# Patient Record
Sex: MALE | Race: WHITE
[De-identification: names, ages, dates, MRNs, and addresses within clinical notes are randomized per-mention and may not be internally consistent; named-entity substitution may affect disease eponyms.]

---

## 2021-07-23 ENCOUNTER — TRANSCRIPTION ENCOUNTER (OUTPATIENT)
Age: 21
End: 2021-07-23

## 2021-07-25 ENCOUNTER — TRANSCRIPTION ENCOUNTER (OUTPATIENT)
Age: 21
End: 2021-07-25

## 2022-04-12 ENCOUNTER — EMERGENCY (EMERGENCY)
Facility: HOSPITAL | Age: 22
LOS: 0 days | Discharge: HOME | End: 2022-04-13
Attending: EMERGENCY MEDICINE | Admitting: EMERGENCY MEDICINE
Payer: COMMERCIAL

## 2022-04-12 VITALS
DIASTOLIC BLOOD PRESSURE: 87 MMHG | TEMPERATURE: 98 F | RESPIRATION RATE: 18 BRPM | SYSTOLIC BLOOD PRESSURE: 140 MMHG | OXYGEN SATURATION: 98 % | HEART RATE: 114 BPM | WEIGHT: 139.99 LBS

## 2022-04-12 DIAGNOSIS — M25.512 PAIN IN LEFT SHOULDER: ICD-10-CM

## 2022-04-12 DIAGNOSIS — S43.015A ANTERIOR DISLOCATION OF LEFT HUMERUS, INITIAL ENCOUNTER: ICD-10-CM

## 2022-04-12 DIAGNOSIS — R20.0 ANESTHESIA OF SKIN: ICD-10-CM
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
## 2022-04-12 PROCEDURE — 99284 EMERGENCY DEPT VISIT MOD MDM: CPT | Mod: 57

## 2022-04-12 PROCEDURE — 23650 CLTX SHO DSLC W/MNPJ WO ANES: CPT | Mod: 54,LT

## 2022-04-12 RX ORDER — LIDOCAINE HCL 20 MG/ML
10 VIAL (ML) INJECTION ONCE
Refills: 0 | Status: COMPLETED | OUTPATIENT
Start: 2022-04-12 | End: 2022-04-12

## 2022-04-12 RX ORDER — KETOROLAC TROMETHAMINE 30 MG/ML
60 SYRINGE (ML) INJECTION ONCE
Refills: 0 | Status: DISCONTINUED | OUTPATIENT
Start: 2022-04-12 | End: 2022-04-12

## 2022-04-12 RX ADMIN — Medication 60 MILLIGRAM(S): at 23:52

## 2022-04-12 NOTE — ED ADULT TRIAGE NOTE - STATUS:
Intact Z Plasty Text: The lesion was extirpated to the level of the fat with a #15 scalpel blade.  Given the location of the defect, shape of the defect and the proximity to free margins a Z-plasty was deemed most appropriate for repair.  Using a sterile surgical marker, the appropriate transposition arms of the Z-plasty were drawn incorporating the defect and placing the expected incisions within the relaxed skin tension lines where possible.    The area thus outlined was incised deep to adipose tissue with a #15 scalpel blade.  The skin margins were undermined to an appropriate distance in all directions utilizing iris scissors.  The opposing transposition arms were then transposed into place in opposite direction and anchored with interrupted buried subcutaneous sutures.

## 2022-04-12 NOTE — ED PROVIDER NOTE - PATIENT PORTAL LINK FT
You can access the FollowMyHealth Patient Portal offered by [facility] by registering at the following website: [email or web address]. By joining [employer]’s FollowMyHealth portal, you will also be able to view your health information using other applications (apps) compatible with our system.

## 2022-04-12 NOTE — ED PROVIDER NOTE - NS ED ROS FT
Constitutional: (-) fever, (-) chills  Cardiovascular: (-) chest pain, (-) syncope  Respiratory: (-) cough, (-) shortness of breath, (-) dyspnea,   Gastrointestinal: (-) vomiting, (-) diarrhea, (-)nausea,  Musculoskeletal: (-) neck pain, (-) back pain, (+) joint pain,  Integumentary: (-) rash, (-) edema, (-) bruises  Neurological: (-) headache, (-) loc, (-) dizziness, (-) tingling, (+)numbness,  Psychiatric: (-) hallucinations, (-)nervousness, (-)depression, (-)SI/HI  Allergic/Immunologic: (-) pruritus

## 2022-04-12 NOTE — ED PROVIDER NOTE - CARE PROVIDER_API CALL
Alfie Stewart (MD)  Orthopaedic Surgery  [address] [place] [address]  Phone: [phone]  Fax: [phone]  Follow Up Time: 1-3 Days

## 2022-04-12 NOTE — ED PROVIDER NOTE - NSFOLLOWUPINSTRUCTIONS_ED_ALL_ED_FT
follow up with orthopedics in 1-2 days     please wear immobilizer until seen by ortho      SHOULDER DISLOCATION - AfterCare(R) Instructions(ER/ED)     Shoulder Dislocation    WHAT YOU NEED TO KNOW:    A shoulder dislocation occurs when the top of your arm bone (humerus) moves out of the socket in your shoulder blade.Shoulder Anatomy         DISCHARGE INSTRUCTIONS:    Return to the emergency department if:     Your injured shoulder and arm become pale or cold.      You cannot move your injured shoulder and arm.      You have increased redness or swelling in your injured shoulder.      Your shoulder becomes dislocated again.    Contact your healthcare provider if:     You have a fever.      You have increased pain in your injured shoulder and arm even after you rest and take your medicine.      You have new weakness or numbness in your injured shoulder and arm.      You have questions or concerns about your condition or care.    Medicines:You may need any of the following:     Prescription pain medicine may be given. Ask your healthcare provider how to take this medicine safely. Some prescription pain medicines contain acetaminophen. Do not take other medicines that contain acetaminophen without talking to your healthcare provider. Too much acetaminophen may cause liver damage. Prescription pain medicine may cause constipation. Ask your healthcare provider how to prevent or treat constipation.       A muscle relaxer may help the tight muscles in your shoulder relax.      Take your medicine as directed. Contact your healthcare provider if you think your medicine is not helping or if you have side effects. Tell him or her if you are allergic to any medicine. Keep a list of the medicines, vitamins, and herbs you take. Include the amounts, and when and why you take them. Bring the list or the pill bottles to follow-up visits. Carry your medicine list with you in case of an emergency.    Rest your injured shoulder and arm: Rest helps your muscles and tissues heal. Avoid activities that cause pain or use an overhead arm motion. Your healthcare provider will tell you when it is safe to return to sports or other daily activities.    How to wear a brace, sling, or splint: A brace, sling, or splint may be needed to limit your movement and protect your injured shoulder.Shoulder Sling         Wear your brace, sling, or splint all the time. Take it off only to bathe or do exercises as directed. Ask your healthcare provider how many weeks you should wear it.      Keep your skin clean and dry. Place padding under your armpit to help absorb sweat and prevent sores on your skin.      Do not hunch your shoulders. This may cause pain. Keep your shoulders relaxed.      Support your wrist and hand with the sling. Cover the knuckles on your hand with your sling. Your wrist should be positioned higher than your elbow. Your wrist may start to hurt or go numb if your sling is too short.    Apply ice: Ice helps decrease swelling and pain. Ice may also help prevent tissue damage. Use an ice pack, or put crushed ice in a plastic bag. Cover it with a towel before you place it on your shoulder. Apply ice for 15 to 20 minutes every hour or as directed.    Exercises: Begin gentle exercises as directed. After healing begins, you may start light exercises so your shoulder does not get stiff. Go to physical therapy if directed. A physical therapist teaches you exercises to help improve movement and strength, and to decrease pain. Do not lift heavy objects or do any exercise that causes severe pain.    Follow up with your healthcare provider in 5 to 10 days: Write down your questions so you remember to ask them during your visits.              Shoulder Immobilizer    WHAT YOU NEED TO KNOW:    A shoulder immobilizer is a device used to keep your arm from moving while your shoulder heals. It is different from a sling because it keeps your arm close to your body. An immobilizer usually has a chest band, an arm band, and a wrist band. Each band will be attached to the chest band. This helps keep your arm from moving up, down, or away from your body.    Shoulder Immobilizer         DISCHARGE INSTRUCTIONS:    Call your doctor if:   •Your arm, hand, or fingers become swollen, numb, painful, or pale.      •You have questions about how to use the immobilizer.      How to use a shoulder immobilizer: Your healthcare provider will teach you how to put on your immobilizer. The following are general guidelines to help you remember what your provider teaches you:   •Place the chest band where you want it to be. Your device may have a specific part that goes on the side of the injured shoulder.      •Wrap the chest band around your body. The band should be snug but not too tight.      •Hook the wrist strap to the chest band. Wrap the wrist band around your wrist. Make sure your hand can move freely. You will need to move your hand and wiggle your fingers to prevent stiffness.      •Hook the arm band to the chest band, under your armpit. Wrap the short end of the arm band around your arm, right above the elbow. Pull the long end of the arm band behind your back. Secure it to the back of the chest band.      •Attach the shoulder strap to the front of the chest band. Attach the band on the side of your body that is not injured. The buckle should be on the front of your body.      •Take the other end of the strap up and over the shoulder that is not injured. Attach it to the chest band behind your back. Your device may have a single strap to attach. It may instead have 2 straps that need to cross your back to form an X.      •Use the buckle to adjust the shoulder strap into the correct position. Your arm will be at about a 90 degree angle in front of your body.      Follow up with your doctor or orthopedist as directed: You may need tests to check how your arm is healing. You may need to have the immobilizer adjusted, or change to a different kind of immobilizer. If your injury has healed, you may not need to keep using the immobilizer. Write down your questions so you remember to ask them during your visits.       © Copyright [employer] 2021

## 2022-04-12 NOTE — ED PROVIDER NOTE - PHYSICAL EXAMINATION
Vital Signs: I have reviewed the initial vital signs.  Constitutional: well-nourished, no acute distress  Musculoskeletal: (+) Obvious dislocation of L shoulder, decreased ROM, distal sensation intact. No overlying skin changes, abrasions or lacerations.   Integumentary: (-) laceration, (-) ecchymosis (-) swelling   Neurologic: awake, alert, extremities’ motor and sensory functions grossly intact, no focal deficits  heme: (-) no adenopathy (-)lymphangitis Vital Signs: I have reviewed the initial vital signs.  Constitutional: well-nourished, no acute distress  Musculoskeletal: (+) Obvious visual and palpable dislocation of L shoulder, decreased ROM, distal sensation intact. No overlying skin changes, abrasions or lacerations.   Integumentary: (-) laceration, (-) ecchymosis (-) swelling   Neurologic: awake, alert, extremities’ motor and sensory functions grossly intact, no focal deficits  heme: (-) no adenopathy (-)lymphangitis

## 2022-04-12 NOTE — ED PROVIDER NOTE - CLINICAL SUMMARY MEDICAL DECISION MAKING FREE TEXT BOX
21yM pw left shoulder pain  40 minutes ago  while playing  basketball -   dislocated shoulder  NVI    -  pt  could not  tolerated  reduction  with pain meds in  lidocaine block -  procedural sedation performed   + reduction .  immobilizer  applied   remains NVI   Patient to be discharged from ED in well appearing condition. Any available test results were discussed with and printed  for patient.  Verbal instructions given, including instructions to return to ED immediately for any new, worsening, or concerning symptoms. Limitations of ED work up discussed.  Patient reports understanding of above with capacity and insight. Written discharge instructions additionally given, including follow-up plan with  ortho .  Dw family at [facility] as well

## 2022-04-12 NOTE — ED PROVIDER NOTE - OBJECTIVE STATEMENT
22 y/o M with no pmhx presents to the ED for evaluation of L shoulder s/p injury while playing basketball about 40 min PTA. Pt states that another players arm, smacked down onto his L shoulder while they were playing and since he has increased pain, numbness, and severe decrease in ROM of the shoulder. Denies any head trauma or LOC. No previous dislocations in the past.

## 2022-04-12 NOTE — ED PROVIDER NOTE - NS ED ATTENDING STATEMENT MOD
This was a shared visit with the JANET. I reviewed and verified the documentation and independently performed the documented:

## 2022-04-13 VITALS
SYSTOLIC BLOOD PRESSURE: 147 MMHG | RESPIRATION RATE: 18 BRPM | HEART RATE: 82 BPM | OXYGEN SATURATION: 98 % | DIASTOLIC BLOOD PRESSURE: 84 MMHG

## 2022-04-13 PROCEDURE — 73030 X-RAY EXAM OF SHOULDER: CPT | Mod: 26,LT,76

## 2022-04-13 RX ORDER — KETAMINE HYDROCHLORIDE 100 MG/ML
40 INJECTION INTRAMUSCULAR; INTRAVENOUS ONCE
Refills: 0 | Status: DISCONTINUED | OUTPATIENT
Start: 2022-04-13 | End: 2022-04-13

## 2022-04-13 RX ORDER — PROPOFOL 10 MG/ML
40 INJECTION, EMULSION INTRAVENOUS ONCE
Refills: 0 | Status: COMPLETED | OUTPATIENT
Start: 2022-04-13 | End: 2022-04-13

## 2022-04-13 RX ADMIN — Medication 10 MILLILITER(S): at 00:06

## 2022-04-13 RX ADMIN — PROPOFOL 40 MILLIGRAM(S): 10 INJECTION, EMULSION INTRAVENOUS at 01:25

## 2022-04-13 RX ADMIN — KETAMINE HYDROCHLORIDE 40 MILLIGRAM(S): 100 INJECTION INTRAMUSCULAR; INTRAVENOUS at 01:25

## 2022-04-13 RX ADMIN — Medication 60 MILLIGRAM(S): at 00:53

## 2022-04-13 NOTE — PROCEDURAL SAFETY CHECKLIST WITH OR WITHOUT SEDATION - NSPOSTCOMMENTFT_GEN_ALL_CORE
pt had left shoulder reduction, pt explained procedure risks and benefits prior to procedure, pt verbalized understanding, consented to procedure, continuous cardiac monitoring in progress, vital signs monitored during procedure, medications administered as ordered, pt tolerated procedure well, xray to be completed, safety maintained, will continue to monitor

## 2022-04-13 NOTE — ED PROCEDURE NOTE - CPROC ED TIME OUT STATEMENT1
PT WAS TOLD THAT SHE NEEDS BLOOD WORK DRAWN FOR HER CT SCAN  SHE NEEDS A BUN / CREA AND SHE WILL BE HAVING IT DONE IN [place] TOMORROW AND SHE IS ASKING IF YOU CAN FAX THE ORDER TO THEM -[phone]  IF YOU HAVE ANY QUESTIONS ON THE LABS THE FACILITY CAN BE REACHED AT [address] [phone]  
“Patient's name, , procedure and correct site were confirmed during the [place] Timeout.”
“Patient's name, , procedure and correct site were confirmed during the [place] Timeout.”

## 2022-04-13 NOTE — ED PROCEDURE NOTE - NS_POSTPROCCAREGUIDE_ED_ALL_ED
Patient is now fully awake, with vital signs and temperature stable, hydration is adequate, patients Telma’s  score is at baseline (or greater than 8), patient and escort has received  discharge education.

## 2022-04-13 NOTE — ED PROCEDURE NOTE - NS ED ATTENDING STATEMENT MOD
This was a shared visit with the JANET. I reviewed and verified the documentation and independently performed the documented:
This was a shared visit with the JANET. I reviewed and verified the documentation and independently performed the documented:

## 2022-04-13 NOTE — ED ADULT NURSE REASSESSMENT NOTE - NS ED NURSE REASSESS COMMENT FT1
pt a&ox3, pt had left shoulder reduction, continuous cardiac monitoring in progress during procedure, vital signs monitored, medications administered as ordered, pt tolerated procedure well, arm in sling by MD, pt awaiting results of post reduction xray, safety maintained, pt denies pain at time of assessment

## 2022-05-30 ENCOUNTER — NON-APPOINTMENT (OUTPATIENT)
Age: 22
End: 2022-05-30

## 2022-08-18 ENCOUNTER — EMERGENCY (EMERGENCY)
Facility: HOSPITAL | Age: 22
LOS: 0 days | Discharge: HOME | End: 2022-08-18
Attending: STUDENT IN AN ORGANIZED HEALTH CARE EDUCATION/TRAINING PROGRAM | Admitting: STUDENT IN AN ORGANIZED HEALTH CARE EDUCATION/TRAINING PROGRAM

## 2022-08-18 VITALS
DIASTOLIC BLOOD PRESSURE: 103 MMHG | OXYGEN SATURATION: 100 % | SYSTOLIC BLOOD PRESSURE: 144 MMHG | RESPIRATION RATE: 12 BRPM | HEART RATE: 66 BPM | TEMPERATURE: 98 F

## 2022-08-18 VITALS
HEIGHT: 68 IN | RESPIRATION RATE: 18 BRPM | OXYGEN SATURATION: 99 % | WEIGHT: 175.05 LBS | HEART RATE: 119 BPM | SYSTOLIC BLOOD PRESSURE: 138 MMHG | TEMPERATURE: 99 F | DIASTOLIC BLOOD PRESSURE: 74 MMHG
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
## 2022-08-18 DIAGNOSIS — F31.9 BIPOLAR DISORDER, UNSPECIFIED: ICD-10-CM
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
## 2022-08-18 DIAGNOSIS — M25.512 PAIN IN LEFT SHOULDER: ICD-10-CM
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
## 2022-08-18 DIAGNOSIS — S43.015A ANTERIOR DISLOCATION OF LEFT HUMERUS, INITIAL ENCOUNTER: ICD-10-CM
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
## 2022-08-18 PROCEDURE — 23655 CLTX SHO DSLC W/MNPJ W/ANES: CPT | Mod: 54,LT

## 2022-08-18 PROCEDURE — 99284 EMERGENCY DEPT VISIT MOD MDM: CPT | Mod: 25

## 2022-08-18 PROCEDURE — 73030 X-RAY EXAM OF SHOULDER: CPT | Mod: 26,LT,76

## 2022-08-18 RX ORDER — PROPOFOL 10 MG/ML
40 INJECTION, EMULSION INTRAVENOUS ONCE
Refills: 0 | Status: COMPLETED | OUTPATIENT
Start: 2022-08-18 | End: 2022-08-18

## 2022-08-18 RX ORDER — KETAMINE HYDROCHLORIDE 100 MG/ML
40 INJECTION INTRAMUSCULAR; INTRAVENOUS ONCE
Refills: 0 | Status: DISCONTINUED | OUTPATIENT
Start: 2022-08-18 | End: 2022-08-18

## 2022-08-18 RX ADMIN — KETAMINE HYDROCHLORIDE 40 MILLIGRAM(S): 100 INJECTION INTRAMUSCULAR; INTRAVENOUS at 15:37

## 2022-08-18 RX ADMIN — PROPOFOL 40 MILLIGRAM(S): 10 INJECTION, EMULSION INTRAVENOUS at 15:37

## 2022-08-18 NOTE — ED PROVIDER NOTE - CLINICAL SUMMARY MEDICAL DECISION MAKING FREE TEXT BOX
22-year-old man with a history of shoulder dislocation back in April presents here complaining of left shoulder dislocation.  Patient states he was playing basketball swiped the ball and suddenly felt his shoulder pop out.  No numbness or tingling no other complaints after his previous dislocation has been following up with Ortho. VS reviewed imaging demonstrated shoulder dislocation requiring procedural sedation, shoulder reduced, Patient a spoken to in detail about results  All questions addressed.  Results of ED work up discussedPatient has proper follow up. Return precautions given. patient to follow up with ortho.

## 2022-08-18 NOTE — ED PROVIDER NOTE - OBJECTIVE STATEMENT
23 y/o male presents to the ED with left shoulder injury while playing basketball. patient with similar symptoms with previous shoulder dislocation in past. patient dx with labrum tear partial and had Physical therapy. patient denies any tingling to digits. no elbow or wrist pain. no falls or direct blow. no head injury or neck pain. patient with deformity at left shoulder.

## 2022-08-18 NOTE — ED ADULT NURSE NOTE - NSIMPLEMENTINTERV_GEN_ALL_ED
no Implemented All Universal Safety Interventions:  [place] to call system. Call bell, personal items and telephone within reach. Instruct patient to call for assistance. Room bathroom lighting operational. Non-slip footwear when patient is off stretcher. Physically safe environment: no spills, clutter or unnecessary equipment. Stretcher in lowest position, wheels locked, appropriate side rails in place.

## 2022-08-18 NOTE — ED PROVIDER NOTE - NSFOLLOWUPINSTRUCTIONS_ED_ALL_ED_FT
Procedural Sedation    During your Emergency Department visit, you were given medication to help relax you and alleviate pain to aid in a diagnostic or therapeutic procedure. The medication given is typically short-acting but may have some lingering effects for up to 48 hours. Do not be alone - have a responsible adult stay with you until you are awake and alert. Do not drive or operate heavy machinery for the next 24 hours. Do not drink alcohol or smoke or take medicines that cause drowsiness. Do not make important decisions or sign legal documents or take care of children on your own.    SEEK IMMEDIATE MEDICAL CARE IF YOU HAVE ANY OF THE FOLLOWING SYMPTOMS: trouble breathing, confusion, inability to urinate, severe pain, rash, lightheadedness/dizziness, or fainting.        SHOULDER DISLOCATION - AfterCare(R) Instructions(ER/ED)     Shoulder Dislocation    WHAT YOU NEED TO KNOW:    A shoulder dislocation occurs when the top of your arm bone (humerus) moves out of the socket in your shoulder blade.Shoulder Anatomy         DISCHARGE INSTRUCTIONS:    Return to the emergency department if:     Your injured shoulder and arm become pale or cold.      You cannot move your injured shoulder and arm.      You have increased redness or swelling in your injured shoulder.      Your shoulder becomes dislocated again.    Contact your healthcare provider if:     You have a fever.      You have increased pain in your injured shoulder and arm even after you rest and take your medicine.      You have new weakness or numbness in your injured shoulder and arm.      You have questions or concerns about your condition or care.    Medicines:You may need any of the following:     Prescription pain medicine may be given. Ask your healthcare provider how to take this medicine safely. Some prescription pain medicines contain acetaminophen. Do not take other medicines that contain acetaminophen without talking to your healthcare provider. Too much acetaminophen may cause liver damage. Prescription pain medicine may cause constipation. Ask your healthcare provider how to prevent or treat constipation.       A muscle relaxer may help the tight muscles in your shoulder relax.      Take your medicine as directed. Contact your healthcare provider if you think your medicine is not helping or if you have side effects. Tell him or her if you are allergic to any medicine. Keep a list of the medicines, vitamins, and herbs you take. Include the amounts, and when and why you take them. Bring the list or the pill bottles to follow-up visits. Carry your medicine list with you in case of an emergency.    Rest your injured shoulder and arm: Rest helps your muscles and tissues heal. Avoid activities that cause pain or use an overhead arm motion. Your healthcare provider will tell you when it is safe to return to sports or other daily activities.    How to wear a brace, sling, or splint: A brace, sling, or splint may be needed to limit your movement and protect your injured shoulder.Shoulder Sling         Wear your brace, sling, or splint all the time. Take it off only to bathe or do exercises as directed. Ask your healthcare provider how many weeks you should wear it.      Keep your skin clean and dry. Place padding under your armpit to help absorb sweat and prevent sores on your skin.      Do not hunch your shoulders. This may cause pain. Keep your shoulders relaxed.      Support your wrist and hand with the sling. Cover the knuckles on your hand with your sling. Your wrist should be positioned higher than your elbow. Your wrist may start to hurt or go numb if your sling is too short.    Apply ice: Ice helps decrease swelling and pain. Ice may also help prevent tissue damage. Use an ice pack, or put crushed ice in a plastic bag. Cover it with a towel before you place it on your shoulder. Apply ice for 15 to 20 minutes every hour or as directed.    Exercises: Begin gentle exercises as directed. After healing begins, you may start light exercises so your shoulder does not get stiff. Go to physical therapy if directed. A physical therapist teaches you exercises to help improve movement and strength, and to decrease pain. Do not lift heavy objects or do any exercise that causes severe pain.    Follow up with your healthcare provider in 5 to 10 days: Write down your questions so you remember to ask them during your visits.

## 2022-08-18 NOTE — ED PROVIDER NOTE - CARE PROVIDER_API CALL
Hong Rebolledo)  Orthopaedic Surgery  [address] [place] [address]  Phone: [phone]  Fax: [phone]  Follow Up Time:

## 2022-08-18 NOTE — PROCEDURAL SAFETY CHECKLIST WITH OR WITHOUT SEDATION - NSPOSTCOMMENTFT_GEN_ALL_CORE
pt had left shoulder reduction  prior to procedure pt was explained risks vs benefits of procedure   pt consented to procedure   vital signs monitored, pt on continuous cardiac monitoring   airway cart at bedside

## 2022-08-18 NOTE — ED PROVIDER NOTE - PATIENT PORTAL LINK FT
You can access the FollowMyHealth Patient Portal offered by [facility] by registering at the following website: [email or web address]. By joining [employer]’s FollowMyHealth portal, you will also be able to view your health information using other applications (apps) compatible with our system.

## 2022-08-18 NOTE — ED PROVIDER NOTE - PHYSICAL EXAMINATION
Vital Signs: I have reviewed the initial vital signs.  Constitutional: well-nourished, no acute distress, normocephalic  Eyes: PERRLA, EOMI, no nystagmus, clear conjunctiva  ENT: MMM  Cardiovascular: regular rate, regular rhythm, no murmur appreciated  Respiratory: unlabored respiratory effort, clear to auscultation bilaterally  Gastrointestinal: soft, non-tender, non-distended  abdomen, no pulsatile mass  Musculoskeletal: supple neck, left upper extremity- +glenoid fossa deformity with decreased rom at shoulder, good rom elbow and wrist, good rom of digits, cap refill in tact  Integumentary: warm, dry, no rash  Neurologic: awake, alert, cranial nerves II-XII grossly intact, extremities’ motor and sensory functions grossly intact, no focal deficits, GCS 15

## 2022-08-18 NOTE — ED PROVIDER NOTE - ATTENDING APP SHARED VISIT CONTRIBUTION OF CARE
22-year-old man with a history of shoulder dislocation back in April presents here complaining of left shoulder dislocation.  Patient states he was playing basketball swiped the ball and suddenly felt his shoulder pop out.  No numbness or tingling no other complaints after his previous dislocation has been following up with Ortho.  CONSTITUTIONAL: WA / WN / NAD  HEAD: NCAT  EYES: PERRL; EOMI;   ENT: Normal pharynx; mucous membranes pink/moist, no erythema.  NECK: Supple;   MSK/EXT: + deformity of left shoulder + ttp , NVS in tact pulses & sensation in tact  SKIN: Warm and dry;   NEURO: AAOx3  PSYCH: Memory Intact, Normal Affect

## 2022-08-18 NOTE — ED PROVIDER NOTE - PROGRESS NOTE DETAILS
SR: spoke with patient and mother extensively , refusing local injection as he states did not work last time, consent for procedural sedation obtained. reduction preformed and successful. rpt xray at bedside and shoulder immobilizer applied

## 2022-08-22 ENCOUNTER — NON-APPOINTMENT (OUTPATIENT)
Age: 22
End: 2022-08-22

## 2022-08-22 DIAGNOSIS — S43.006A UNSPECIFIED DISLOCATION OF UNSPECIFIED SHOULDER JOINT, INITIAL ENCOUNTER: ICD-10-CM

## 2022-08-22 PROBLEM — Z00.00 ENCOUNTER FOR PREVENTIVE HEALTH EXAMINATION: Status: ACTIVE | Noted: 2022-08-22

## 2022-08-24 DIAGNOSIS — S43.005A UNSPECIFIED DISLOCATION OF LEFT SHOULDER JOINT, INITIAL ENCOUNTER: ICD-10-CM

## 2022-10-09 ENCOUNTER — EMERGENCY (EMERGENCY)
Facility: HOSPITAL | Age: 22
LOS: 0 days | Discharge: HOME | End: 2022-10-09
Attending: EMERGENCY MEDICINE | Admitting: EMERGENCY MEDICINE

## 2022-10-09 VITALS
DIASTOLIC BLOOD PRESSURE: 75 MMHG | RESPIRATION RATE: 18 BRPM | OXYGEN SATURATION: 97 % | HEIGHT: 68 IN | WEIGHT: 179.9 LBS | SYSTOLIC BLOOD PRESSURE: 113 MMHG | HEART RATE: 62 BPM | TEMPERATURE: 98 F
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
## 2022-10-09 DIAGNOSIS — S43.015A ANTERIOR DISLOCATION OF LEFT HUMERUS, INITIAL ENCOUNTER: ICD-10-CM

## 2022-10-09 DIAGNOSIS — M25.512 PAIN IN LEFT SHOULDER: ICD-10-CM

## 2022-10-09 DIAGNOSIS — F31.9 BIPOLAR DISORDER, UNSPECIFIED: ICD-10-CM

## 2022-10-09 PROBLEM — Z87.39 PERSONAL HISTORY OF OTHER DISEASES OF THE MUSCULOSKELETAL SYSTEM AND CONNECTIVE TISSUE: Chronic | Status: ACTIVE | Noted: 2022-08-18

## 2022-10-09 PROCEDURE — 99284 EMERGENCY DEPT VISIT MOD MDM: CPT | Mod: 57

## 2022-10-09 PROCEDURE — 23650 CLTX SHO DSLC W/MNPJ WO ANES: CPT | Mod: 54,LT,78

## 2022-10-09 PROCEDURE — 73030 X-RAY EXAM OF SHOULDER: CPT | Mod: 26,LT,76

## 2022-10-09 RX ORDER — LIDOCAINE HCL 20 MG/ML
10 VIAL (ML) INJECTION ONCE
Refills: 0 | Status: COMPLETED | OUTPATIENT
Start: 2022-10-09 | End: 2022-10-09

## 2022-10-09 RX ORDER — MORPHINE SULFATE 50 MG/1
8 CAPSULE, EXTENDED RELEASE ORAL ONCE
Refills: 0 | Status: DISCONTINUED | OUTPATIENT
Start: 2022-10-09 | End: 2022-10-09

## 2022-10-09 RX ADMIN — MORPHINE SULFATE 8 MILLIGRAM(S): 50 CAPSULE, EXTENDED RELEASE ORAL at 04:50

## 2022-10-09 RX ADMIN — Medication 10 MILLILITER(S): at 04:50

## 2022-10-09 NOTE — ED PROVIDER NOTE - CLINICAL SUMMARY MEDICAL DECISION MAKING FREE TEXT BOX
Given morphine and intra-articular lidocaine.  Close reduction done.  Placed on sling.  Referred to Ortho.

## 2022-10-09 NOTE — ED PROVIDER NOTE - PHYSICAL EXAMINATION
VITAL SIGNS: I have reviewed nursing notes and confirm.  CONSTITUTIONAL: Well-developed; well-nourished; in no acute distress.  SKIN: Skin exam is warm and dry, no acute rash.  HEAD: Normocephalic; atraumatic.  EYES: Conjunctiva and sclera clear.  EXT: (+) TTP to L shoulder with limited ROM. (+) flattening of left glenohumeral joint. Limited ROM 2/2 pain. No elbow/forearm TTP. Radial pulses 2+ B/L and equal.  Strength 5/5. Sensation intact.   NEURO: Alert, oriented. Grossly unremarkable. No focal deficits.

## 2022-10-09 NOTE — ED PROVIDER NOTE - PATIENT PORTAL LINK FT
You can access the FollowMyHealth Patient Portal offered by [facility] by registering at the following website: [email or web address]. By joining [employer]’s FollowMyHealth portal, you will also be able to view your health information using other applications (apps) compatible with our system.

## 2022-10-09 NOTE — ED PROVIDER NOTE - ATTENDING APP SHARED VISIT CONTRIBUTION OF CARE
I personally evaluated the patient. I reviewed the Resident´s or Physician Assistant´s note (as assigned above), and agree with the findings and plan except as documented in my note.  22-year-old male, history of shoulder dislocations, was sleeping and dislocated left shoulder.  No trauma.  Exam shows tenderness and deformity of shoulder, intact neurovascular status.

## 2022-10-09 NOTE — ED PROVIDER NOTE - CARE PROVIDER_API CALL
Tay Quispe)  [facility]  [address] [place] [address] [place] [address]  Phone: [phone]  Fax: [phone]  Follow Up Time: 1-3 Days

## 2022-10-09 NOTE — ED PROVIDER NOTE - OBJECTIVE STATEMENT
23 yo M with PMHx of bipolar disorder and recurrent L shoulder dislocation presents to the ED c/o moderate left shoulder pain. Pt was asleep and thinks he reached his arm above his head and immediately felt pain in his shoulder. Pain is throbbing, constant, non-radiating and worse with movement. Symptoms are consistent with previous shoulder dislocation. He denies other complaints. No fever, chills, numbness, weakness.

## 2022-10-09 NOTE — ED PROVIDER NOTE - NSFOLLOWUPINSTRUCTIONS_ED_ALL_ED_FT
SHOULDER DISLOCATION - AfterCare(R) Instructions(ER/ED)     Shoulder Dislocation    WHAT YOU NEED TO KNOW:    A shoulder dislocation occurs when the top of your arm bone (humerus) moves out of the socket in your shoulder blade. Shoulder Anatomy         DISCHARGE INSTRUCTIONS:    Return to the emergency department if:     Your injured shoulder and arm become pale or cold.      You cannot move your injured shoulder and arm.      You have increased redness or swelling in your injured shoulder.      Your shoulder becomes dislocated again.    Contact your healthcare provider if:     You have a fever.      You have increased pain in your injured shoulder and arm even after you rest and take your medicine.      You have new weakness or numbness in your injured shoulder and arm.      You have questions or concerns about your condition or care.    Medicines:You may need any of the following:     Prescription pain medicine may be given. Ask your healthcare provider how to take this medicine safely. Some prescription pain medicines contain acetaminophen. Do not take other medicines that contain acetaminophen without talking to your healthcare provider. Too much acetaminophen may cause liver damage. Prescription pain medicine may cause constipation. Ask your healthcare provider how to prevent or treat constipation.       A muscle relaxer may help the tight muscles in your shoulder relax.      Take your medicine as directed. Contact your healthcare provider if you think your medicine is not helping or if you have side effects. Tell him or her if you are allergic to any medicine. Keep a list of the medicines, vitamins, and herbs you take. Include the amounts, and when and why you take them. Bring the list or the pill bottles to follow-up visits. Carry your medicine list with you in case of an emergency.    Rest your injured shoulder and arm: Rest helps your muscles and tissues heal. Avoid activities that cause pain or use an overhead arm motion. Your healthcare provider will tell you when it is safe to return to sports or other daily activities.    How to wear a brace, sling, or splint: A brace, sling, or splint may be needed to limit your movement and protect your injured shoulder.Shoulder Sling         Wear your brace, sling, or splint all the time. Take it off only to bathe or do exercises as directed. Ask your healthcare provider how many weeks you should wear it.      Keep your skin clean and dry. Place padding under your armpit to help absorb sweat and prevent sores on your skin.      Do not hunch your shoulders. This may cause pain. Keep your shoulders relaxed.      Support your wrist and hand with the sling. Cover the knuckles on your hand with your sling. Your wrist should be positioned higher than your elbow. Your wrist may start to hurt or go numb if your sling is too short.    Apply ice: Ice helps decrease swelling and pain. Ice may also help prevent tissue damage. Use an ice pack, or put crushed ice in a plastic bag. Cover it with a towel before you place it on your shoulder. Apply ice for 15 to 20 minutes every hour or as directed.    Exercises: Begin gentle exercises as directed. After healing begins, you may start light exercises so your shoulder does not get stiff. Go to physical therapy if directed. A physical therapist teaches you exercises to help improve movement and strength, and to decrease pain. Do not lift heavy objects or do any exercise that causes severe pain.    Follow up with your healthcare provider in 5 to 10 days: Write down your questions so you remember to ask them during your visits.

## 2022-10-09 NOTE — ED PROVIDER NOTE - NS ED ROS FT
Review of Systems  Constitutional:  No fever, chills.  MS:  (+) L shoulder pain  Skin:  No skin rash, pruritis, jaundice, or lesions.  Neuro:  No headache, dizziness, loss of sensation, or focal weakness.  No change in mental status.   Endocrine: No history of thyroid disease or diabetes.

## 2023-01-10 ENCOUNTER — EMERGENCY (EMERGENCY)
Facility: HOSPITAL | Age: 23
LOS: 0 days | Discharge: HOME | End: 2023-01-10
Attending: EMERGENCY MEDICINE | Admitting: EMERGENCY MEDICINE
Payer: COMMERCIAL

## 2023-01-10 VITALS
WEIGHT: 190.04 LBS | TEMPERATURE: 97 F | RESPIRATION RATE: 18 BRPM | OXYGEN SATURATION: 99 % | DIASTOLIC BLOOD PRESSURE: 68 MMHG | SYSTOLIC BLOOD PRESSURE: 123 MMHG | HEART RATE: 59 BPM

## 2023-01-10 DIAGNOSIS — S43.005A UNSPECIFIED DISLOCATION OF LEFT SHOULDER JOINT, INITIAL ENCOUNTER: ICD-10-CM

## 2023-01-10 DIAGNOSIS — F31.9 BIPOLAR DISORDER, UNSPECIFIED: ICD-10-CM
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
## 2023-01-10 DIAGNOSIS — M25.512 PAIN IN LEFT SHOULDER: ICD-10-CM

## 2023-01-10 DIAGNOSIS — Z87.39 PERSONAL HISTORY OF OTHER DISEASES OF THE MUSCULOSKELETAL SYSTEM AND CONNECTIVE TISSUE: ICD-10-CM

## 2023-01-10 PROCEDURE — 73030 X-RAY EXAM OF SHOULDER: CPT | Mod: 26,LT

## 2023-01-10 PROCEDURE — 99285 EMERGENCY DEPT VISIT HI MDM: CPT | Mod: 57

## 2023-01-10 PROCEDURE — 23650 CLTX SHO DSLC W/MNPJ WO ANES: CPT | Mod: 54

## 2023-01-10 RX ORDER — MORPHINE SULFATE 50 MG/1
8 CAPSULE, EXTENDED RELEASE ORAL ONCE
Refills: 0 | Status: DISCONTINUED | OUTPATIENT
Start: 2023-01-10 | End: 2023-01-10

## 2023-01-10 RX ORDER — LIDOCAINE HCL 20 MG/ML
20 VIAL (ML) INJECTION ONCE
Refills: 0 | Status: DISCONTINUED | OUTPATIENT
Start: 2023-01-10 | End: 2023-01-10

## 2023-01-10 RX ADMIN — MORPHINE SULFATE 8 MILLIGRAM(S): 50 CAPSULE, EXTENDED RELEASE ORAL at 03:22

## 2023-01-10 NOTE — ED PROVIDER NOTE - PHYSICAL EXAMINATION
Physical Exam    Vital Signs: I have reviewed the initial vital signs.  Constitutional: well-nourished, appears stated age, no acute distress  Musculoskeletal: + left shoulder dislocation   Integumentary: warm, dry,   Neurologic: awake, alert, extremities’ motor and sensory functions grossly intact  Psychiatric: appropriate mood, appropriate affect

## 2023-01-10 NOTE — ED PROVIDER NOTE - OTHER MEDICATION DECIDED AGAINST BECAUSE
pt requesting  procedural sedation.  shared  decision making  attempt with  analgesia and possible intraarticular block   - if unsuccessful will escalate to  sedation.

## 2023-01-10 NOTE — ED PROVIDER NOTE - OBJECTIVE STATEMENT
22 year old male past medical history of shoulder dislocations in the past has seen orthopedic and told needed surgery but didn't want to have it comes to emergency room for shoulder dislocation. patient states that he turned over in bed and felt it pop out. no direct trauma. no numbness or weakness.

## 2023-01-10 NOTE — ED PROVIDER NOTE - PATIENT PORTAL LINK FT
You can access the FollowMyHealth Patient Portal offered by [facility] by registering at the following website: [email or web address]. By joining [employer]’s FollowMyHealth portal, you will also be able to view your health information using other applications (apps) compatible with our system.

## 2023-01-10 NOTE — ED ADULT NURSE REASSESSMENT NOTE - NS ED NURSE REASSESS COMMENT FT1
Lido given by PA / MD.  Pt d/c to home with instructions for follow up and care.  Fingers warm and pink with good movement

## 2023-01-10 NOTE — ED PROVIDER NOTE - NSFOLLOWUPINSTRUCTIONS_ED_ALL_ED_FT
Follow up with your orthopedic in 1-2 days       SHOULDER DISLOCATION - AfterCare(R) Instructions(ER/ED)     Shoulder Dislocation    WHAT YOU NEED TO KNOW:    A shoulder dislocation occurs when the top of your arm bone (humerus) moves out of the socket in your shoulder blade.Shoulder Anatomy         DISCHARGE INSTRUCTIONS:    Return to the emergency department if:     Your injured shoulder and arm become pale or cold.      You cannot move your injured shoulder and arm.      You have increased redness or swelling in your injured shoulder.      Your shoulder becomes dislocated again.    Contact your healthcare provider if:     You have a fever.      You have increased pain in your injured shoulder and arm even after you rest and take your medicine.      You have new weakness or numbness in your injured shoulder and arm.      You have questions or concerns about your condition or care.    Medicines:You may need any of the following:     Prescription pain medicine may be given. Ask your healthcare provider how to take this medicine safely. Some prescription pain medicines contain acetaminophen. Do not take other medicines that contain acetaminophen without talking to your healthcare provider. Too much acetaminophen may cause liver damage. Prescription pain medicine may cause constipation. Ask your healthcare provider how to prevent or treat constipation.       A muscle relaxer may help the tight muscles in your shoulder relax.      Take your medicine as directed. Contact your healthcare provider if you think your medicine is not helping or if you have side effects. Tell him or her if you are allergic to any medicine. Keep a list of the medicines, vitamins, and herbs you take. Include the amounts, and when and why you take them. Bring the list or the pill bottles to follow-up visits. Carry your medicine list with you in case of an emergency.    Rest your injured shoulder and arm: Rest helps your muscles and tissues heal. Avoid activities that cause pain or use an overhead arm motion. Your healthcare provider will tell you when it is safe to return to sports or other daily activities.    How to wear a brace, sling, or splint: A brace, sling, or splint may be needed to limit your movement and protect your injured shoulder.Shoulder Sling         Wear your brace, sling, or splint all the time. Take it off only to bathe or do exercises as directed. Ask your healthcare provider how many weeks you should wear it.      Keep your skin clean and dry. Place padding under your armpit to help absorb sweat and prevent sores on your skin.      Do not hunch your shoulders. This may cause pain. Keep your shoulders relaxed.      Support your wrist and hand with the sling. Cover the knuckles on your hand with your sling. Your wrist should be positioned higher than your elbow. Your wrist may start to hurt or go numb if your sling is too short.    Apply ice: Ice helps decrease swelling and pain. Ice may also help prevent tissue damage. Use an ice pack, or put crushed ice in a plastic bag. Cover it with a towel before you place it on your shoulder. Apply ice for 15 to 20 minutes every hour or as directed.    Exercises: Begin gentle exercises as directed. After healing begins, you may start light exercises so your shoulder does not get stiff. Go to physical therapy if directed. A physical therapist teaches you exercises to help improve movement and strength, and to decrease pain. Do not lift heavy objects or do any exercise that causes severe pain.    Follow up with your healthcare provider in 5 to 10 days: Write down your questions so you remember to ask them during your visits.              Shoulder Immobilizer    WHAT YOU NEED TO KNOW:    A shoulder immobilizer is a device used to keep your arm from moving while your shoulder heals. It is different from a sling because it keeps your arm close to your body. An immobilizer usually has a chest band, an arm band, and a wrist band. Each band will be attached to the chest band. This helps keep your arm from moving up, down, or away from your body.    Shoulder Immobilizer         DISCHARGE INSTRUCTIONS:    Call your doctor if:   •Your arm, hand, or fingers become swollen, numb, painful, or pale.      •You have questions about how to use the immobilizer.      How to use a shoulder immobilizer: Your healthcare provider will teach you how to put on your immobilizer. The following are general guidelines to help you remember what your provider teaches you:   •Place the chest band where you want it to be. Your device may have a specific part that goes on the side of the injured shoulder.      •Wrap the chest band around your body. The band should be snug but not too tight.      •Hook the wrist strap to the chest band. Wrap the wrist band around your wrist. Make sure your hand can move freely. You will need to move your hand and wiggle your fingers to prevent stiffness.      •Hook the arm band to the chest band, under your armpit. Wrap the short end of the arm band around your arm, right above the elbow. Pull the long end of the arm band behind your back. Secure it to the back of the chest band.      •Attach the shoulder strap to the front of the chest band. Attach the band on the side of your body that is not injured. The buckle should be on the front of your body.      •Take the other end of the strap up and over the shoulder that is not injured. Attach it to the chest band behind your back. Your device may have a single strap to attach. It may instead have 2 straps that need to cross your back to form an X.      •Use the buckle to adjust the shoulder strap into the correct position. Your arm will be at about a 90 degree angle in front of your body.      Follow up with your doctor or orthopedist as directed: You may need tests to check how your arm is healing. You may need to have the immobilizer adjusted, or change to a different kind of immobilizer. If your injury has healed, you may not need to keep using the immobilizer. Write down your questions so you remember to ask them during your visits.       © Copyright [employer] 2021

## 2023-01-10 NOTE — ED PROVIDER NOTE - NS ED ROS FT
Constitutional: (-) fever  Eyes/ENT: (-) blurry vision, (-) epistaxis  Cardiovascular: (-) chest pain, (-) syncope  Respiratory: (-) cough, (-) shortness of breath  Gastrointestinal: (-) vomiting, (-) diarrhea  Musculoskeletal: (-) neck pain, (-) back pain, (+) joint pain  Integumentary: (-) rash, (-) edema

## 2023-01-10 NOTE — ED PROVIDER NOTE - CLINICAL SUMMARY MEDICAL DECISION MAKING FREE TEXT BOX
pt pw  recurrence of left shoulder  dislocation - occurred about an hour ago  while sleeping -  follows with ortho -  plan for surgery - pt is  refusing .     NVI    shoulder reduced after morphine IM      xray  confirmed   NVI  sling applied   Patient to be discharged from ED in well appearing condition. Any available test results were discussed with and printed  for patient.  Verbal instructions given, including instructions to return to ED immediately for any new, worsening, or concerning symptoms. Limitations of ED work up discussed.  Patient reports understanding of above with capacity and insight. Written discharge instructions additionally given, including follow-up plan.

## 2023-04-25 NOTE — PROCEDURAL SAFETY CHECKLIST WITH OR WITHOUT SEDATION - NSPRESEDATION2FT_GEN_ALL_CORE
[facility]   Physical Therapy Treatment Note/Discharge Summary     Name: Renee Vargas  Clinic Number: [id number]  Visit Date: 4/25/2023  Therapy Diagnosis: Back Pain   Physician: Yamileth Castrejon FNP     Physician Orders: PT Eval and Treat   Medical Diagnosis from Referral: Thoracic and Low Back Pain  Evaluation Date: 3/10/2023  Authorization Period Expiration: 2/27/2024  Plan of Care Expiration: 5/5/2023    Visit # / Visits authorized: 6/ 20  PTA Visit #: 3/5     Time In: 4:50 pm  Time Out: 5:25 pm  Total Billable Time:  38  minutes    SUBJECTIVE     Pt reports: Pain around the same    She was compliant with home exercise program.  Response to previous treatment: slight muscle soreness after the last treatment session   Functional change: None    Pain: 4/10  Location: bilateral back      Prior Level of Function: Able to work and move without pain   Current Level of Function: Pain with mobility     OBJECTIVE     Objective Measures updated at progress report unless specified.     Treatment     Renee received the treatments listed below:      therapeutic exercises to develop strength, endurance, ROM, flexibility, and posture for 25 minutes including:     Bike/NuStep  5 minutes bike        Calf Stretch  on slantboard 3 x 20 second hold    Hamstring Stretch  3x20 seconds (bilateral)   Swiss ball rollouts forward left and right  10 x each 5 second hold    Cybex rows hi / lo  3 plates 30x each   Cybex Lat Pull Down  2 plates 30 x each   Cybex Back Extension  3 plates 30 x each   Cybex hip abduction bilateral  2 x 10 each 1 plate   Cybex abdominal  1 plate 2 x 10   Cybex Knee extension  3 plates 3 x 10    Cybex Leg Press 6 plates 3 x 10        manual therapy techniques: Joint mobilizations, Manual traction, and Myofacial release were applied to the: hips/back for 0 minutes, including:  DKTC stretch  LTR manual OP   LLD assessment     neuromuscular re-education activities to  improve: Balance, Coordination, Kinesthetic, and Posture for 15 minutes. The following activities were included:         therapeutic activities to improve functional performance for 0  minutes, including:    Patient Education and Home Exercises     Home Exercises Provided and Patient Education Provided     Education provided:   - educated Patient on correct form and mechanics with exercises and reiterated importance of home exercise program.     Written Home Exercises Provided: . Exercises were reviewed and Renee was able to demonstrate them prior to the end of the session.  Renee demonstrated fair  understanding of the education provided. See EMR under Patient Instructions for exercises provided 3/21/2023.    ASSESSMENT   Supervisory visit with Debby Matta DPT PT prior to initial PTA visit.   Patient tolerated the addition of all exercises with no complaints of pain. Patient tolerated new strengthening interventions well . Patient is progressing well. Continue to progress patient's POC as tolerated.       PMH:  Renee is a 33 y.o. female referred to outpatient Physical Therapy with a medical diagnosis of Low Back and T Spine Pain. Renee reports: She has had low back pain for years. The upper back pain started February 11, 2023. She was brushing her teeth and a sharp pain hit between her shoulder blades that caused her to hit her knees. She reports intense pain for approx 1 hour. The pain began to ease up a little after that. She reports this happens off and on and is completely random. At times she can just be sitting still and it will happen. The low back pain is constant but the Thoracic pain is intermittent. Her low back pain worsens with lying down and she has trouble rolling over. The low back pain is a little better when she is up and moving.   Patient presents with Poor Core, Upper Extremity, and Lower Extremity Strength. Patient appears to have lax ligaments and is double jointed in several joints.  Feel this is contributing to her pain and instability. SI was assessed as most of her Low Back pain seems to be centered here. It appears she has an Anterior Rotation on the Right. Right PSIS is more painful and is hypomobile. Right leg was slightly longer in supine. Attempted an MET but was unsuccessful. Will continue to assess. During the T Spine assessment, Patient presents with forward head and rounded shoulders. Shoulder Strength is grossly 4-/5. Tone and spasms were noted in Upper Traps and Rhomboids. Patient is generally weak overall. The weak muscles and the lax ligaments together are causing instability of her joints. She will require an overall strengthening program to help stabilize her joints.       Renee Is progressing towards her goals.   Pt prognosis is Good.     Pt will continue to benefit from skilled outpatient physical therapy to address the deficits listed in the problem list box on initial evaluation, provide pt/family education and to maximize pt's level of independence in the home and community environment.     Pt's spiritual, cultural and educational needs considered and pt agreeable to plan of care and goals.     Anticipated barriers to physical therapy: Chronic low back issue    Goals: Short Term Goals: 4 weeks   1. Patient will be Independent with Home Exercise Program   2. Patient will demonstrate with improved Posture and Body Mechanics  3. Patient will increase Lumbosacral Range of Motion by 25%   4. Patient will increase Lower Extremity, Upper Extremity, and Core Strength to grossly 4+/5  5. Patient will decrease complaints of pain with activity to Less than or Equal to 5/10      Long Term Goals: 8 weeks   1. Patient will tolerate 30 minutes of activity with complaints of Pain at Less Than or Equal to 4/10         PLAN     Plan of care Certification: 3/10/2023 to 5/5/2023.     Outpatient Physical Therapy 2 times weekly for 8 weeks to include the following interventions:  Cervical/Lumbar Traction, Electrical Stimulation to decrease pain and inflammation as needed, Manual Therapy, Moist Heat/ Ice, Neuromuscular Re-ed, Patient Education, Therapeutic Activities, and Therapeutic Exercise.     Jayjay Funes, PT  04/25/2023       Patient did not return to Therapy following this treatment on 4/25/2023. Therefore patient is discharged from Physical Therapy services at current level of function as listed above.     MARCO GOLDEN, PT, DPT             Anesthesia confirms case reviewed for anesthesia risk alert.

## 2023-06-08 NOTE — ED ADULT NURSE NOTE - NSICDXPASTMEDICALHX_GEN_ALL_CORE_FT
Returned call to motherRose Marie. States patient self-harmed yesterday, did not provide details. States 's department was called. Patient denied having suicidal thoughts, but states she was self-harming to help the pain. A therapist from the 's department was consulted and mother was advised to reach out to PCP to possibly discuss starting her on anxiety medication. Patient was given help line by [facility]'s department to call if needed.    Mother states patient has been having problems with aggression and refuses to talk about her feelings. Patient always says she's fine. Mother states patient is adopted, and has recently reached out to her birth mother. Questions why she was given up for adoption. Mother reports additional stressors including girls, her boyfriend, and home.     Patient does have a therapist, last seen in April. Patient was scheduled last week, but cancelled the appointment because she felt fine. Mother is unsure of what to do. Mother does not believe she has any suicidal thoughts. She is currently at school. Please advise, no appt availability and PCP is out of office next week.     PAST MEDICAL HISTORY:  Bipolar disorder     H/O dislocation of shoulder left

## 2024-06-26 ENCOUNTER — EMERGENCY (EMERGENCY)
Facility: HOSPITAL | Age: 24
LOS: 0 days | Discharge: HOME | End: 2024-06-26
Attending: EMERGENCY MEDICINE
Payer: COMMERCIAL

## 2024-06-26 VITALS
DIASTOLIC BLOOD PRESSURE: 67 MMHG | OXYGEN SATURATION: 100 % | HEART RATE: 88 BPM | TEMPERATURE: 99 F | SYSTOLIC BLOOD PRESSURE: 106 MMHG | RESPIRATION RATE: 20 BRPM

## 2024-06-26 VITALS
HEART RATE: 96 BPM | RESPIRATION RATE: 20 BRPM | HEIGHT: 70 IN | DIASTOLIC BLOOD PRESSURE: 100 MMHG | OXYGEN SATURATION: 99 % | SYSTOLIC BLOOD PRESSURE: 141 MMHG | TEMPERATURE: 98 F | WEIGHT: 199.96 LBS

## 2024-06-26 DIAGNOSIS — R68.83 CHILLS (WITHOUT FEVER): ICD-10-CM

## 2024-06-26 DIAGNOSIS — R11.2 NAUSEA WITH VOMITING, UNSPECIFIED: ICD-10-CM

## 2024-06-26 DIAGNOSIS — R19.7 DIARRHEA, UNSPECIFIED: ICD-10-CM

## 2024-06-26 DIAGNOSIS — E83.42 HYPOMAGNESEMIA: ICD-10-CM

## 2024-06-26 DIAGNOSIS — R10.9 UNSPECIFIED ABDOMINAL PAIN: ICD-10-CM

## 2024-06-26 LAB
ALBUMIN SERPL ELPH-MCNC: 4.9 G/DL — SIGNIFICANT CHANGE UP (ref 3.5–5.2)
ALP SERPL-CCNC: 62 U/L — SIGNIFICANT CHANGE UP (ref 30–115)
ALT FLD-CCNC: 18 U/L — SIGNIFICANT CHANGE UP (ref 0–41)
ANION GAP SERPL CALC-SCNC: 16 MMOL/L — HIGH (ref 7–14)
AST SERPL-CCNC: 12 U/L — SIGNIFICANT CHANGE UP (ref 0–41)
BASOPHILS # BLD AUTO: 0.04 K/UL — SIGNIFICANT CHANGE UP (ref 0–0.2)
BASOPHILS NFR BLD AUTO: 0.4 % — SIGNIFICANT CHANGE UP (ref 0–1)
BILIRUB DIRECT SERPL-MCNC: <0.2 MG/DL — SIGNIFICANT CHANGE UP (ref 0–0.3)
BILIRUB INDIRECT FLD-MCNC: >0.3 MG/DL — SIGNIFICANT CHANGE UP (ref 0.2–1.2)
BILIRUB SERPL-MCNC: 0.5 MG/DL — SIGNIFICANT CHANGE UP (ref 0.2–1.2)
BUN SERPL-MCNC: 17 MG/DL — SIGNIFICANT CHANGE UP (ref 10–20)
CALCIUM SERPL-MCNC: 9.6 MG/DL — SIGNIFICANT CHANGE UP (ref 8.4–10.5)
CHLORIDE SERPL-SCNC: 102 MMOL/L — SIGNIFICANT CHANGE UP (ref 98–110)
CO2 SERPL-SCNC: 23 MMOL/L — SIGNIFICANT CHANGE UP (ref 17–32)
CREAT SERPL-MCNC: 1 MG/DL — SIGNIFICANT CHANGE UP (ref 0.7–1.5)
EGFR: 107 ML/MIN/1.73M2 — SIGNIFICANT CHANGE UP
EOSINOPHIL # BLD AUTO: 0.08 K/UL — SIGNIFICANT CHANGE UP (ref 0–0.7)
EOSINOPHIL NFR BLD AUTO: 0.8 % — SIGNIFICANT CHANGE UP (ref 0–8)
GLUCOSE SERPL-MCNC: 184 MG/DL — HIGH (ref 70–99)
HCT VFR BLD CALC: 47.1 % — SIGNIFICANT CHANGE UP (ref 42–52)
HGB BLD-MCNC: 16 G/DL — SIGNIFICANT CHANGE UP (ref 14–18)
IMM GRANULOCYTES NFR BLD AUTO: 1.2 % — HIGH (ref 0.1–0.3)
LIDOCAIN IGE QN: 19 U/L — SIGNIFICANT CHANGE UP (ref 7–60)
LYMPHOCYTES # BLD AUTO: 0.57 K/UL — LOW (ref 1.2–3.4)
LYMPHOCYTES # BLD AUTO: 5.7 % — LOW (ref 20.5–51.1)
MAGNESIUM SERPL-MCNC: 1.4 MG/DL — LOW (ref 1.8–2.4)
MCHC RBC-ENTMCNC: 27.5 PG — SIGNIFICANT CHANGE UP (ref 27–31)
MCHC RBC-ENTMCNC: 34 G/DL — SIGNIFICANT CHANGE UP (ref 32–37)
MCV RBC AUTO: 81.1 FL — SIGNIFICANT CHANGE UP (ref 80–94)
MONOCYTES # BLD AUTO: 0.78 K/UL — HIGH (ref 0.1–0.6)
MONOCYTES NFR BLD AUTO: 7.8 % — SIGNIFICANT CHANGE UP (ref 1.7–9.3)
NEUTROPHILS # BLD AUTO: 8.36 K/UL — HIGH (ref 1.4–6.5)
NEUTROPHILS NFR BLD AUTO: 84.1 % — HIGH (ref 42.2–75.2)
NRBC # BLD: 0 /100 WBCS — SIGNIFICANT CHANGE UP (ref 0–0)
PLATELET # BLD AUTO: 168 K/UL — SIGNIFICANT CHANGE UP (ref 130–400)
PMV BLD: 9.4 FL — SIGNIFICANT CHANGE UP (ref 7.4–10.4)
POTASSIUM SERPL-MCNC: 5.2 MMOL/L — HIGH (ref 3.5–5)
POTASSIUM SERPL-SCNC: 5.2 MMOL/L — HIGH (ref 3.5–5)
PROT SERPL-MCNC: 6.7 G/DL — SIGNIFICANT CHANGE UP (ref 6–8)
RBC # BLD: 5.81 M/UL — SIGNIFICANT CHANGE UP (ref 4.7–6.1)
RBC # FLD: 12.2 % — SIGNIFICANT CHANGE UP (ref 11.5–14.5)
SODIUM SERPL-SCNC: 141 MMOL/L — SIGNIFICANT CHANGE UP (ref 135–146)
WBC # BLD: 9.95 K/UL — SIGNIFICANT CHANGE UP (ref 4.8–10.8)
WBC # FLD AUTO: 9.95 K/UL — SIGNIFICANT CHANGE UP (ref 4.8–10.8)

## 2024-06-26 PROCEDURE — 99284 EMERGENCY DEPT VISIT MOD MDM: CPT | Mod: 25

## 2024-06-26 PROCEDURE — 80048 BASIC METABOLIC PNL TOTAL CA: CPT

## 2024-06-26 PROCEDURE — 36415 COLL VENOUS BLD VENIPUNCTURE: CPT

## 2024-06-26 PROCEDURE — 96374 THER/PROPH/DIAG INJ IV PUSH: CPT

## 2024-06-26 PROCEDURE — 83735 ASSAY OF MAGNESIUM: CPT

## 2024-06-26 PROCEDURE — 80076 HEPATIC FUNCTION PANEL: CPT

## 2024-06-26 PROCEDURE — 99284 EMERGENCY DEPT VISIT MOD MDM: CPT

## 2024-06-26 PROCEDURE — 83690 ASSAY OF LIPASE: CPT

## 2024-06-26 PROCEDURE — 96375 TX/PRO/DX INJ NEW DRUG ADDON: CPT

## 2024-06-26 PROCEDURE — 96376 TX/PRO/DX INJ SAME DRUG ADON: CPT

## 2024-06-26 PROCEDURE — 85025 COMPLETE CBC W/AUTO DIFF WBC: CPT

## 2024-06-26 RX ORDER — MAGNESIUM SULFATE 500 MG/ML
2 VIAL (ML) INJECTION ONCE
Refills: 0 | Status: COMPLETED | OUTPATIENT
Start: 2024-06-26 | End: 2024-06-26

## 2024-06-26 RX ORDER — ONDANSETRON 8 MG/1
1 TABLET, FILM COATED ORAL
Qty: 12 | Refills: 0
Start: 2024-06-26 | End: 2024-06-28

## 2024-06-26 RX ORDER — KETOROLAC TROMETHAMINE 30 MG/ML
15 SYRINGE (ML) INJECTION ONCE
Refills: 0 | Status: DISCONTINUED | OUTPATIENT
Start: 2024-06-26 | End: 2024-06-26

## 2024-06-26 RX ORDER — SODIUM CHLORIDE 9 MG/ML
2000 INJECTION INTRAMUSCULAR; INTRAVENOUS; SUBCUTANEOUS ONCE
Refills: 0 | Status: COMPLETED | OUTPATIENT
Start: 2024-06-26 | End: 2024-06-26

## 2024-06-26 RX ORDER — FAMOTIDINE 10 MG/ML
20 INJECTION INTRAVENOUS ONCE
Refills: 0 | Status: COMPLETED | OUTPATIENT
Start: 2024-06-26 | End: 2024-06-26

## 2024-06-26 RX ORDER — ONDANSETRON 8 MG/1
4 TABLET, FILM COATED ORAL ONCE
Refills: 0 | Status: COMPLETED | OUTPATIENT
Start: 2024-06-26 | End: 2024-06-26

## 2024-06-26 RX ADMIN — FAMOTIDINE 20 MILLIGRAM(S): 10 INJECTION INTRAVENOUS at 04:36

## 2024-06-26 RX ADMIN — ONDANSETRON 4 MILLIGRAM(S): 8 TABLET, FILM COATED ORAL at 04:36

## 2024-06-26 RX ADMIN — Medication 15 MILLIGRAM(S): at 04:36

## 2024-06-26 RX ADMIN — SODIUM CHLORIDE 2000 MILLILITER(S): 9 INJECTION INTRAMUSCULAR; INTRAVENOUS; SUBCUTANEOUS at 04:36

## 2024-06-26 RX ADMIN — Medication 25 GRAM(S): at 05:57

## 2024-06-26 RX ADMIN — Medication 150 GRAM(S): at 05:12

## 2024-06-26 NOTE — ED ADULT TRIAGE NOTE - GLASGOW COMA SCALE: [employer] RESPONSE, MLM
Products Recommended: I recommended ISDIN or ELTA Clear sunscreen Detail Level: Detailed General Sunscreen Counseling: I recommended a broad spectrum sunscreen with a SPF of 30 or higher.  I explained that SPF 30 sunscreens block approximately 97 percent of the sun's harmful rays.  Sunscreens should be applied at least 15 minutes prior to expected sun exposure and then every 2 hours after that as long as sun exposure continues. If swimming or exercising sunscreen should be reapplied every 45 minutes to an hour after getting wet or sweating.  One ounce, or the equivalent of a shot glass full of sunscreen, is adequate to protect the skin not covered by a bathing suit. I also recommended a lip balm with a sunscreen as well. Sun protective clothing can be used in lieu of sunscreen but must be worn the entire time you are exposed to the sun's rays. (M6) obeys commands

## 2024-06-26 NOTE — ED PROVIDER NOTE - NSFOLLOWUPINSTRUCTIONS_ED_ALL_ED_FT
RETURN TO ED  FOR ANY NEW WORSENING OR CONCERNING SYMPTOMS TO YOU, ALSO AS WE DISCUSSED. WE ARE HERE AND HAPPY TO TAKE CARE OF YOU. OTHERWISE FOLLOW UP WITH YOUR PRIMARY DOCTOR IN 1-3 DAYS      Vomiting, Adult  ImageVomiting occurs when stomach contents are thrown up and out of the mouth. Many people notice nausea before vomiting. Vomiting can make you feel weak and dehydrated. Dehydration can make you tired and thirsty, cause you to have a dry mouth, and decrease how often you urinate. Older adults and people who have other diseases or a weak immune system are at higher risk for dehydration. It is important to treat vomiting as told by your health care provider.    Follow these instructions at home:  Follow your health care provider’s instructions about how to care for yourself at home.    Eating and drinking     Follow these recommendations as told by your health care provider:    Take an oral rehydration solution (ORS). This is a drink that is sold at pharmacies and retail stores.  Eat bland, easy-to-digest foods in small amounts as you are able. These foods include bananas, applesauce, rice, lean meats, toast, and crackers.  Drink clear fluids in small amounts as you are able. Clear fluids include water, ice chips, low-calorie sports drinks, and fruit juice that has water added (diluted fruit juice).  Avoid fluids that contain a lot of sugar or caffeine.  Avoid alcohol and foods that are spicy or fatty.    General instructions     Image   Wash your hands frequently with soap and water. If soap and water are not available, use hand . Make sure that everyone in your household washes their hands frequently.  Take over-the-counter and prescription medicines only as told by your health care provider.  Watch your condition for any changes.  Keep all follow-up visits as told by your health care provider. This is important.  Contact a health care provider if:  You have a fever.  You are not able to keep fluids down.  Your vomiting gets worse.  You have new symptoms.  You feel light-headed or dizzy.  You have a headache.  You have muscle cramps.  Get help right away if:  You have pain in your chest, neck, arm, or jaw.  You feel extremely weak or you faint.  You have persistent vomiting.  You have vomit that is bright red or looks like black coffee grounds.  You have stools that are bloody or black, or stools that look like tar.  You have severe pain, cramping, or bloating in your abdomen.  You have a severe headache, a stiff neck, or both.  You have a rash.  You have trouble breathing or you are breathing very quickly.  Your heart is beating very quickly.  Your skin feels cold and clammy.  You feel confused.  You have pain while urinating.  You have signs of dehydration, such as:    Dark urine, or very little or no urine.  Cracked lips.  Dry mouth.  Sunken eyes.  Sleepiness.  Weakness.    These symptoms may represent a serious problem that is an emergency. Do not wait to see if the symptoms will go away. Get medical help right away. Call your local emergency services (911 in the [place] Do not drive yourself to the hospital.     This information is not intended to replace advice given to you by your health care provider. Make sure you discuss any questions you have with your health care provider.

## 2024-06-26 NOTE — ED PROVIDER NOTE - CLINICAL SUMMARY MEDICAL DECISION MAKING FREE TEXT BOX
24-year-old male presents with nausea vomiting diarrhea multiple episodes today unable to tolerate p.o. positive sick contacts at home mother and niece at the scene.  Abdomen soft nontender symptomatic treatment given  Labs resulted at the time of my review were noted to be within acceptable parametersl, except for Mg was  which 1.4 - repleted.  Pt feels much better -  tolerating PO   Patient to be discharged from ED in well appearing condition. Any available test results were discussed with and printed  for patient.  Verbal instructions given, including instructions to return to ED immediately for any new, worsening, or concerning symptoms. Limitations of ED work up discussed.  Patient reports understanding of above with capacity and insight. Written discharge instructions additionally given, including follow-up plan.

## 2024-06-26 NOTE — ED PROVIDER NOTE - PATIENT PORTAL LINK FT
You can access the FollowMyHealth Patient Portal offered by [facility] by registering at the following website: [email or web address]. By joining [facility]’s FollowMyHealth portal, you will also be able to view your health information using other applications (apps) compatible with our system.

## 2024-06-26 NOTE — ED ADULT NURSE NOTE - NSFALLUNIVINTERV_ED_ALL_ED
Bed/Stretcher in lowest position, wheels locked, appropriate side rails in place/Call bell, personal items and telephone in reach/Instruct patient to call for assistance before getting out of bed/chair/stretcher/Non-slip footwear applied when patient is off stretcher/[employer] to call system/Physically safe environment - no spills, clutter or unnecessary equipment/Purposeful proactive rounding/Room/bathroom lighting operational, light cord in reach

## 2024-06-26 NOTE — ED PROVIDER NOTE - OBJECTIVE STATEMENT
24 year old male no sig past medical history comes to emergency room for nausea, vomiting, diarrhea and crampy abd pain. patient states that his brothers family is all sick this week with stomach bug and tonight him and mother got the bug and he cannot stop vomiting. no fever. + chills.

## 2025-06-23 ENCOUNTER — NON-APPOINTMENT (OUTPATIENT)
Age: 25
End: 2025-06-23